# Patient Record
Sex: MALE | Race: BLACK OR AFRICAN AMERICAN | Employment: STUDENT | URBAN - METROPOLITAN AREA
[De-identification: names, ages, dates, MRNs, and addresses within clinical notes are randomized per-mention and may not be internally consistent; named-entity substitution may affect disease eponyms.]

---
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
## 2024-01-10 ENCOUNTER — HOSPITAL ENCOUNTER (EMERGENCY)
Facility: HOSPITAL | Age: 19
Discharge: HOME | End: 2024-01-10
Attending: EMERGENCY MEDICINE
Payer: COMMERCIAL

## 2024-01-10 VITALS
BODY MASS INDEX: 21.47 KG/M2 | WEIGHT: 150 LBS | HEART RATE: 80 BPM | HEIGHT: 70 IN | DIASTOLIC BLOOD PRESSURE: 71 MMHG | OXYGEN SATURATION: 97 % | TEMPERATURE: 98.3 F | SYSTOLIC BLOOD PRESSURE: 110 MMHG | RESPIRATION RATE: 16 BRPM

## 2024-01-10 DIAGNOSIS — Z76.0 ENCOUNTER FOR MEDICATION REFILL: ICD-10-CM

## 2024-01-10 DIAGNOSIS — F22 PARANOIA (HCC): Primary | ICD-10-CM

## 2024-01-10 PROCEDURE — 99283 EMERGENCY DEPT VISIT LOW MDM: CPT

## 2024-01-10 RX ORDER — ESCITALOPRAM OXALATE 10 MG/1
10 TABLET ORAL DAILY
Qty: 90 TABLET | Refills: 1 | Status: SHIPPED | OUTPATIENT
Start: 2024-01-10

## 2024-01-10 RX ORDER — RISPERIDONE 1 MG/1
1 TABLET ORAL 2 TIMES DAILY
Qty: 60 TABLET | Refills: 3 | Status: SHIPPED | OUTPATIENT
Start: 2024-01-10 | End: 2024-02-09

## 2024-01-10 RX ORDER — CITALOPRAM HYDROBROMIDE 10 MG/1
10 TABLET ORAL DAILY
Qty: 30 TABLET | Refills: 3 | Status: SHIPPED | OUTPATIENT
Start: 2024-01-10 | End: 2024-01-10

## 2024-01-10 RX ORDER — CITALOPRAM HYDROBROMIDE 10 MG/1
10 TABLET ORAL DAILY
COMMUNITY

## 2024-01-10 RX ORDER — RISPERIDONE 1 MG/1
1 TABLET ORAL 2 TIMES DAILY
COMMUNITY

## 2024-01-10 ASSESSMENT — PAIN SCALES - GENERAL: PAINLEVEL_OUTOF10: 0

## 2024-01-10 ASSESSMENT — PAIN - FUNCTIONAL ASSESSMENT: PAIN_FUNCTIONAL_ASSESSMENT: 0-10

## 2024-01-10 NOTE — ED TRIAGE NOTES
Pt states that he has been having \" episodes for a few days\"  + hearing voices. Denies si or hi. Requesting refills on his medications, states he has been taking them sporadically to keep from running out

## 2024-01-10 NOTE — ED PROVIDER NOTES
[facility] EMERGENCY DEPARTMENT  EMERGENCY DEPARTMENT HISTORY AND PHYSICAL EXAM      Date: 1/10/2024  Patient Name: Raffy Stephen  MRN: [id number]  Birthdate 2005  Date of evaluation: 1/10/2024  Provider: Daren Rogers DO   Note Started: 11:43 AM EST 1/10/24    HISTORY OF PRESENT ILLNESS     Chief Complaint   Patient presents with    Medication Refill     History Provided By: Patient    HPI: Raffy Stephen is a 18 y.o. male with past medical history of psychiatric disease  who presents with paranoid thoughts.  He has been hearing people to the doors and next door and thinks they are talking about him.  He has been feeling paranoid.  This started about 2 weeks ago.  He has been running out of his medicines including his Celexa and Risperdal.  Denies SI or HI.  He is not certain if he is having hallucinations or not.    PAST MEDICAL HISTORY   Past Medical History:  Psychiatric disease    Past Surgical History:  No past surgical history on file.    Family History:  No family history on file.    Social History:  Social History     Tobacco Use    Smoking status: Never    Smokeless tobacco: Never   Substance Use Topics    Alcohol use: Not Currently    Drug use: Yes     Types: Marijuana (Weed)       Allergies:  No Known Allergies    PCP: No primary care provider on file.    Current Meds:   No current facility-administered medications for this encounter.     Current Outpatient Medications   Medication Sig Dispense Refill    citalopram (CELEXA) 10 MG tablet Take 1 tablet by mouth daily      risperiDONE (RISPERDAL) 1 MG tablet Take 1 tablet by mouth 2 times daily      citalopram (CELEXA) 10 MG tablet Take 1 tablet by mouth daily 30 tablet 3    risperiDONE (RISPERDAL) 1 MG tablet Take 1 tablet by mouth 2 times daily 60 tablet 3     Social Determinants of Health:   Social Determinants of Health     Tobacco Use: Low Risk  (9/25/2022)    Patient History     Smoking Tobacco Use: Never     Smokeless Tobacco Use: Never     Passive

## 2024-01-10 NOTE — DISCHARGE INSTRUCTIONS
Thank you!  Thank you for allowing me to care for you in the emergency department. It is my goal to provide you with excellent care. If you have not received excellent quality care, please ask to speak to the nurse manager. Please fill out the survey that will come to you by mail or email since we listen to your feedback!     Below you will find a list of your tests from today's visit.  Should you have any questions, please do not hesitate to call the emergency department.    Labs  No results found for this or any previous visit (from the past 12 hour(s)).    Radiologic Studies  No orders to display     ------------------------------------------------------------------------------------------------------------  The exam and treatment you received in the Emergency Department were for an urgent problem and are not intended as complete care. It is important that you follow-up with a doctor, nurse practitioner, or physician assistant to:  (1) confirm your diagnosis,  (2) re-evaluation of changes in your illness and treatment, and  (3) for ongoing care. Please take your discharge instructions with you when you go to your follow-up appointment.     If you have any problem arranging a follow-up appointment, contact the Emergency Department.  If your symptoms become worse or you do not improve as expected and you are unable to reach your health care provider, please return to the Emergency Department. We are available 24 hours a day.     If a prescription has been provided, please have it filled as soon as possible to prevent a delay in treatment. If you have any questions or reservations about taking the medication due to side effects or interactions with other medications, please call your primary care provider or contact the ER.

## 2025-03-25 ENCOUNTER — HOSPITAL ENCOUNTER (EMERGENCY)
Facility: HOSPITAL | Age: 20
Discharge: HOME | End: 2025-03-25
Payer: MEDICAID

## 2025-03-25 VITALS
TEMPERATURE: 97.9 F | HEIGHT: 70 IN | BODY MASS INDEX: 22.33 KG/M2 | RESPIRATION RATE: 16 BRPM | SYSTOLIC BLOOD PRESSURE: 102 MMHG | HEART RATE: 65 BPM | WEIGHT: 156 LBS | OXYGEN SATURATION: 100 % | DIASTOLIC BLOOD PRESSURE: 82 MMHG

## 2025-03-25 DIAGNOSIS — Z20.2 EXPOSURE TO CHLAMYDIA: Primary | ICD-10-CM

## 2025-03-25 LAB
APPEARANCE UR: CLEAR
BACTERIA URNS QL MICRO: NEGATIVE /HPF
BILIRUB UR QL: NEGATIVE
COLOR UR: YELLOW
EPITH CASTS URNS QL MICRO: ABNORMAL /LPF
GLUCOSE UR STRIP.AUTO-MCNC: NEGATIVE MG/DL
HGB UR QL STRIP: NEGATIVE
KETONES UR QL STRIP.AUTO: NEGATIVE MG/DL
LEUKOCYTE ESTERASE UR QL STRIP.AUTO: ABNORMAL
NITRITE UR QL STRIP.AUTO: NEGATIVE
PH UR STRIP: 5 (ref 5–8)
PROT UR STRIP-MCNC: ABNORMAL MG/DL
RBC #/AREA URNS HPF: ABNORMAL /HPF (ref 0–5)
SP GR UR REFRACTOMETRY: 1.02 (ref 1–1.03)
URINE CULTURE IF INDICATED: ABNORMAL
UROBILINOGEN UR QL STRIP.AUTO: 0.1 EU/DL (ref 0.2–1)
WBC URNS QL MICRO: ABNORMAL /HPF (ref 0–4)

## 2025-03-25 PROCEDURE — 81001 URINALYSIS AUTO W/SCOPE: CPT

## 2025-03-25 PROCEDURE — 99283 EMERGENCY DEPT VISIT LOW MDM: CPT

## 2025-03-25 PROCEDURE — 6370000000 HC RX 637 (ALT 250 FOR IP): Performed by: PHYSICIAN ASSISTANT

## 2025-03-25 PROCEDURE — 87591 N.GONORRHOEAE DNA AMP PROB: CPT

## 2025-03-25 PROCEDURE — 87491 CHLMYD TRACH DNA AMP PROBE: CPT

## 2025-03-25 RX ORDER — AZITHROMYCIN 500 MG/1
1000 TABLET, FILM COATED ORAL
Status: COMPLETED | OUTPATIENT
Start: 2025-03-25 | End: 2025-03-25

## 2025-03-25 RX ADMIN — AZITHROMYCIN DIHYDRATE 1000 MG: 500 TABLET ORAL at 16:50

## 2025-03-25 ASSESSMENT — LIFESTYLE VARIABLES
HOW MANY STANDARD DRINKS CONTAINING ALCOHOL DO YOU HAVE ON A TYPICAL DAY: 1 OR 2
HOW OFTEN DO YOU HAVE A DRINK CONTAINING ALCOHOL: MONTHLY OR LESS

## 2025-03-25 NOTE — ED PROVIDER NOTES
[facility] EMERGENCY DEPT  EMERGENCY DEPARTMENT HISTORY AND PHYSICAL EXAM      Date: 3/25/2025  Patient Name: Raffy Stephen  MRN: [id number]  YOB: 2005  Date of evaluation: 3/25/2025  Provider: Pravin Walker PA-C   Note Started: 4:25 PM EDT 3/25/25    HISTORY OF PRESENT ILLNESS     Chief Complaint   Patient presents with   • Exposure to STD       History Provided By: Patient    HPI: Raffy Stephen is a 19 y.o. male with no significant past medical history presents to this ED for STD testing.  Patient states that his partner recently tested positive for chlamydia.  Presents asymptomatic.  Specifically denies any penile discharge, dysuria, frequency, hematuria, testicular pain or swelling, abdominal pain, flank pain, genital lesion/sore, rashes.  Reports otherwise being well and has no further concerns.    PAST MEDICAL HISTORY   Past Medical History:  No past medical history on file.    Past Surgical History:  No past surgical history on file.    Family History:  No family history on file.    Social History:  Social History     Tobacco Use   • Smoking status: Never   • Smokeless tobacco: Never   Substance Use Topics   • Alcohol use: Not Currently   • Drug use: Yes     Types: Marijuana (Weed)       Allergies:  No Known Allergies    PCP: None, None    Current Meds:   No current facility-administered medications for this encounter.     Current Outpatient Medications   Medication Sig Dispense Refill   • citalopram (CELEXA) 10 MG tablet Take 1 tablet by mouth daily     • risperiDONE (RISPERDAL) 1 MG tablet Take 1 tablet by mouth 2 times daily     • risperiDONE (RISPERDAL) 1 MG tablet Take 1 tablet by mouth 2 times daily 60 tablet 3   • escitalopram (LEXAPRO) 10 MG tablet Take 1 tablet by mouth daily 90 tablet 1       Social Determinants of Health:   Social Drivers of Health     Tobacco Use: Low Risk  (9/25/2022)    Received from [facility]  (prior to 6/17/2023), [facility]  (prior

## 2025-03-25 NOTE — ED TRIAGE NOTES
Pt ambulatory to triage reporting his partner tested positive for chlamydia and he needs to be tested

## 2025-03-25 NOTE — DISCHARGE INSTRUCTIONS
PRIMARY CARE in [facility]:  [address] [place] [address].  [phone]  Deborah Mendes MD Family Medicine  David Champagne MD Family Medicine  Lazarus Dumont MD Family Medicine    [facility]: [address] [place] [address]. [phone]   Bj Kelley MD Family Medicine  Royer Figueroa, RAYA Family Medicine  Brandi Chester, RAYA Family Medicine    [facility]: [address], [address], [place] [address]. [phone]  Carito Nicolas MD Family Medicine  Stephanie Perez MD Family Medicine  Sancho Travis, RAYA Family Medicine  Eliza Guadalupe, RAYA Family Medicine    [facility]: [address], [address] [facility] [address]. [phone]  Celia Dempsey MD Internal Medicine  Sergey Yoo MD Internal Medicine  Cathy Buckley MD Internal Medicine  Martha Marcos, PA Family Medicine    [facility]: [address], [place] [address]. [phone]  Alison Mcgee MD Family Medicine  Haily Correa MD Family Medicine  Terence Brennan, DO Infectious Disease  Soto Padron MD Family Medicine    [facility]: [address], [address], [place] [address]. [phone]  Thalia Betancur,  Family Medicine  Mayda Travis NP Internal Medicine  Gina Lamb, RAYA Internal Medicine    [facility]: [address], [place] [address]. [phone]  Terry Moreno MD Internal Medicine  Jacky Stoll MD Internal Medicine    Primary Care [facility]: [address] [place] [address]. [phone]  Savanah Caruso MD Family Medicine  Dinora Rivers MD Family Medicine  Butch De Leon MD Family Medicine  Jes Turk, RAYA Family Medicine  Abelino Travis, ARPN, CNP Family Medicine    [facility]

## 2025-03-26 LAB
C TRACH DNA SPEC QL NAA+PROBE: POSITIVE
N GONORRHOEA DNA SPEC QL NAA+PROBE: NEGATIVE
SAMPLE TYPE: ABNORMAL
SERVICE CMNT-IMP: ABNORMAL
SPECIMEN SOURCE: ABNORMAL

## 2025-03-29 ENCOUNTER — RESULTS FOLLOW-UP (OUTPATIENT)
Facility: HOSPITAL | Age: 20
End: 2025-03-29